# Patient Record
Sex: FEMALE | Race: WHITE | NOT HISPANIC OR LATINO | URBAN - METROPOLITAN AREA
[De-identification: names, ages, dates, MRNs, and addresses within clinical notes are randomized per-mention and may not be internally consistent; named-entity substitution may affect disease eponyms.]

---

## 2017-10-20 ENCOUNTER — TELEPHONE (OUTPATIENT)
Dept: TRANSPLANT | Facility: CLINIC | Age: 27
End: 2017-10-20

## 2017-10-20 NOTE — TELEPHONE ENCOUNTER
Ingris called to ask what she cn do to help her dad move forward toward transplant.  I so not see consent to talk with Ingris but she is feeling like she is not getting clear answerers from her father.  I let Ingris know that either Camila or Elizabeth will call her for some basic information and possibly a way to get consent from Gilmar Gee so that recipient coordinator can speak openly with his daughter Ingris.   Elizabeth or Camila could you call Ingris.  Dad is Gee Shay

## 2017-10-27 ENCOUNTER — TELEPHONE (OUTPATIENT)
Dept: TRANSPLANT | Facility: CLINIC | Age: 27
End: 2017-10-27

## 2017-10-27 NOTE — TELEPHONE ENCOUNTER
Left Ingris a message stating that I was informed from Hillary, the living donor coord. That she has a few questions. I offered my help and left my contact information should she like to return my call.

## 2017-10-31 ENCOUNTER — TELEPHONE (OUTPATIENT)
Dept: TRANSPLANT | Facility: CLINIC | Age: 27
End: 2017-10-31

## 2017-10-31 NOTE — TELEPHONE ENCOUNTER
I called Ingris and asked her how can I help or what questions did she have? She wondered if there was anything from her end that she needed to do to help her father become active? She said her father was not very clear of the process at this point. I assured her that he has done everything he needs to do, that he will be presented to the committee 17 for activation consideration.   She was happy about that. She asked some questions also about paired exchange and wondered if her father was counseled about if he should only consider living donor vs . I assured her that he is and he can make that choice. I discussed his wait time and that he may be contacted fairly soon after activation.   I confirmed that should her father be active, I would notify the living donor team so they could bring her in for evaluation.

## 2017-11-02 ENCOUNTER — TELEPHONE (OUTPATIENT)
Dept: TRANSPLANT | Facility: CLINIC | Age: 27
End: 2017-11-02

## 2017-11-02 NOTE — TELEPHONE ENCOUNTER
Called Ingris to review the contact information for her father. I asked about Jada, his spouse. She said they just got . She is the next of kin/emergency contact. I asked her to contact [facility] where Gee resides, do discuss the best communication for an organ offer, should he receive one. She agreed and will call the assisted living this morning and call me back with a plan. Will update his chart for the on call coordinator.

## 2017-11-02 NOTE — TELEPHONE ENCOUNTER
Informed her Dad/recip is now active. Wants to reconsider coming for eval-will let us know. Informed her that the [facility] will be  in Dec.

## 2018-01-07 ENCOUNTER — HEALTH MAINTENANCE LETTER (OUTPATIENT)
Age: 28
End: 2018-01-07

## 2020-03-10 ENCOUNTER — HEALTH MAINTENANCE LETTER (OUTPATIENT)
Age: 30
End: 2020-03-10

## 2020-12-27 ENCOUNTER — HEALTH MAINTENANCE LETTER (OUTPATIENT)
Age: 30
End: 2020-12-27

## 2021-04-24 ENCOUNTER — HEALTH MAINTENANCE LETTER (OUTPATIENT)
Age: 31
End: 2021-04-24

## 2021-10-09 ENCOUNTER — HEALTH MAINTENANCE LETTER (OUTPATIENT)
Age: 31
End: 2021-10-09

## 2022-05-16 ENCOUNTER — HEALTH MAINTENANCE LETTER (OUTPATIENT)
Age: 32
End: 2022-05-16

## 2022-09-11 ENCOUNTER — HEALTH MAINTENANCE LETTER (OUTPATIENT)
Age: 32
End: 2022-09-11

## 2023-07-29 ENCOUNTER — HEALTH MAINTENANCE LETTER (OUTPATIENT)
Age: 33
End: 2023-07-29